# Patient Record
Sex: MALE | Race: WHITE | Employment: FULL TIME | URBAN - METROPOLITAN AREA
[De-identification: names, ages, dates, MRNs, and addresses within clinical notes are randomized per-mention and may not be internally consistent; named-entity substitution may affect disease eponyms.]

---

## 2022-05-23 ENCOUNTER — VIRTUAL VISIT (OUTPATIENT)
Dept: INTERNAL MEDICINE CLINIC | Age: 37
End: 2022-05-23
Payer: COMMERCIAL

## 2022-05-23 DIAGNOSIS — Z76.89 ENCOUNTER TO ESTABLISH CARE: ICD-10-CM

## 2022-05-23 DIAGNOSIS — R29.898 PROMINENT XIPHOID: ICD-10-CM

## 2022-05-23 DIAGNOSIS — R06.83 SNORING: Primary | ICD-10-CM

## 2022-05-23 PROBLEM — K21.9 GASTROESOPHAGEAL REFLUX DISEASE WITHOUT ESOPHAGITIS: Status: ACTIVE | Noted: 2022-05-23

## 2022-05-23 PROCEDURE — 99202 OFFICE O/P NEW SF 15 MIN: CPT | Performed by: NURSE PRACTITIONER

## 2022-05-23 RX ORDER — HYDROXYZINE 25 MG/1
TABLET, FILM COATED ORAL
COMMUNITY
Start: 2022-05-19

## 2022-05-23 RX ORDER — PREDNISONE 10 MG/1
TABLET ORAL
COMMUNITY
Start: 2022-05-19

## 2022-05-23 NOTE — Clinical Note
GM, quick question. If a patient has a mass to their xiphoid, is that something you can remove? If not, who would you recommend to do so?

## 2022-05-23 NOTE — PROGRESS NOTES
Chief Complaint   Patient presents with   [address]    Referral Request     trouble sleeping at night    Cyst     under sternum x years- hard knot, hears occ popping, no pain, size of golf ball    Other     text [email or web address] link [phone]       1. Have you been to the ER, urgent care clinic since your last visit? Hospitalized since your last visit? Yes When: last week [facility]: [facility] Reason for visit: poison ivy     2. Have you seen or consulted any other health care providers outside of the [address] [place] [address] since your last visit? Include any pap smears or colon screening.  No

## 2022-05-23 NOTE — PROGRESS NOTES
Saddie Pallas is a 40 y.o. male new patient, here for evaluation of the following chief complaint(s):   Establish Care, Referral Request (trouble sleeping at night), Cyst (under sternum x years- hard knot, hears occ popping, no pain, size of golf ball), and Other (text [email or web address] link [phone])          Assessment & Plan:   Diagnoses and all orders for this visit:    1. Snoring  -     SLEEP MEDICINE REFERRAL    2. Prominent xiphoid  -     XR CHEST PA LAT; Future    3. Encounter to establish care      Follow-up and Dispositions    · Return in about 3 months (around 9/6/2022) for Physical with labs/lab review. Routing History           We discussed the expected course, resolution and complications of the diagnosis(es) in detail. Medication risks, benefits, costs, interactions, and alternatives were discussed as indicated. I advised him to contact the office if his condition worsens, changes or fails to improve as anticipated. He expressed understanding with the diagnosis(es) and plan. Specific pt instructions until next visit: lose weight, call if any problems, referred to sleep med. Xray ordered for suspected prominent xiphoid process, otherwise will refer to GEN SURG. Subjective:   Saddie Pallas is a 40 y.o. male who was seen for Establish Care, Referral Request (trouble sleeping at night), Cyst (under sternum x years- hard knot, hears occ popping, no pain, size of golf ball), and Other (text [email or web address] link [phone])      Pt here to establish care. Has concern for enlargement to end of sternum. Noted a few years ago. Unsure if size has changed. No SOB or pain associated with it, but has NOT had it looked at before. No trauma reported prior to onset. Sleep apnea Review: There is a history of excessive daytime fatigue with associated excessive snoring at night. There is also a history of periods of apnea at night as witnessed by a partner and family  The patient is noton CPAP.       There are no problems to display for this patient. Current Outpatient Medications   Medication Sig Dispense Refill    predniSONE (STERAPRED DS) 10 mg dose pack       hydrOXYzine HCL (ATARAX) 25 mg tablet        No Known Allergies  History reviewed. No pertinent past medical history. Past Surgical History:   Procedure Laterality Date    HX ORTHOPAEDIC         Review of Systems   Constitutional: Negative for fever and malaise/fatigue. Eyes: Negative for blurred vision. Respiratory: Negative for cough and shortness of breath. Cardiovascular: Negative for chest pain and leg swelling. Neurological: Negative for dizziness, weakness and headaches. Objective:   Vital Signs: (As obtained by patient/caregiver at home)  There were no vitals taken for this visit. Physical Exam:  General appearance - alert, well  appearing, and in no distress. Mental status - A/O x 4,normal mood and affect. Eyes- no periorbital edema, drainage, or irritation noted. Nose- no obvious drainage or swelling. Throat- no obvious swelling, goiter, or notable lymphadenopathy  Chest - Symmetric chest rise. No wheezing or coughing. No distress. Skin- normal skin tone noted. No hyperpigmentation or obvious deformities. No diaphoresis noted. No flushing. Neuro - Normal speech, no focal findings or movement disorder. Other pertinent observable physical exam findings:-  +prominence noted on video at xiphoid process when shown on screen, pt does have to suck in stomach to make area noticeable however. Maeve Nugent is being evaluated by a Virtual Visit (video visit) encounter to address concerns as mentioned above. A caregiver was present when appropriate. Due to this being a TeleHealth encounter (During REC-49 public health emergency), evaluation of the following organ systems was limited: Vitals/Constitutional/EENT/Resp/CV/GI//MS/Neuro/Skin/Heme-Lymph-Imm.   Pursuant to the emergency declaration under the [facility] Act and the [address] waiver authority and the [employer] Act, this Virtual Visit was conducted with patient's (and/or legal guardian's) consent, to reduce the patient's risk of exposure to COVID-19 and provide necessary medical care. The patient (and/or legal guardian) has also been advised to contact this office for worsening conditions or problems, and seek emergency medical treatment and/or call 911 if deemed necessary. Patient identification was verified at the start of the visit: YES    Services were provided through a video synchronous discussion virtually to substitute for in-person clinic visit. Patient and provider were located at their individual homes. An electronic signature was used to authenticate this note.   -- Cary Steinberg NP

## 2022-05-23 NOTE — PATIENT INSTRUCTIONS
Sleep Studies: About This Test  What is it? Sleep studies are tests that watch what happens to your body during sleep. These studies usually are done in a sleep lab. Sleep labs are often located in hospitals. Sleep studies you do at home can be done with portable equipment. But they may not give the same results as a sleep lab. Why is this test done? Sleep studies may be done if your sleep is not restful or if you are tired all day. These studies can help find sleep problems, such as:  · Sleep apnea. · Excessive snoring. · Problems staying awake, such as narcolepsy. · Problems with nighttime behaviors. These include sleepwalking, night terrors, bed-wetting, and REM behavior disorders (RBD). · Conditions such as periodic limb movement disorder. This is repeated muscle twitching of the feet, arms, or legs during sleep. · Seizures that occur at night (nocturnal seizures). · Ongoing problems that have not responded to treatment. How do you prepare for the test?  · You may be asked to keep a sleep diary before your sleep study. · Don't take any naps on the day of your test.  · You may be asked to avoid food or drinks with caffeine during the afternoon and evening before your test.  · If the sleep study will be done in a sleep lab, you will be asked to shower or bathe before your test. Don't use sprays, oils, or gels on your hair. Don't wear makeup, fingernail polish, or fake nails. Take a small overnight bag with personal items, such as a toothbrush, a comb, favorite pillows or blankets, and a book. You can wear your own nightclothes. · If you will have portable sleep monitoring, your doctor will explain how to use the equipment at home. How is the test done? · In the sleep lab, you will be in a private room, much like a hotel room. · Small pads or patches called electrodes will be placed on your head and body with a small amount of glue and tape.  These will record things like brain activity, eye movement, oxygen levels, and snoring. · Soft elastic belts will be placed around your chest and belly to measure your breathing. · Your blood oxygen levels will be checked by a small clip (oximeter) placed either on the tip of your index finger or on your earlobe. · If you have sleep apnea, you may wear a mask that is connected to a continuous positive airway pressure (CPAP) machine. · Depending on the type of test, you will be allowed to sleep through the night or you'll be awakened periodically and asked to stay awake for a while. · If you use portable sleep monitoring, follow the instructions your doctor gave you. How long does the test take? · You will stay in the sleep lab overnight. For some tests, you will also stay part of the next day. What happens after the test?  · You will be able to go home right away. · You may not sleep well during the test and may be tired the next day. · You can go back to your usual activities. · After your sleep problem has been identified, you may need a second study if your doctor orders treatment such as CPAP. Follow-up care is a key part of your treatment and safety. Be sure to make and go to all appointments, and call your doctor if you are having problems. It's also a good idea to keep a list of the medicines you take. Ask your doctor when you can expect to have your test results. Where can you learn more? Go to http://www.gray.com/  Enter M687 in the search box to learn more about \"Sleep Studies: About This Test.\"  Current as of: July 6, 2021               Content Version: 13.2  © 2006-2022 [employer]. Care instructions adapted under license by [employer] (which disclaims liability or warranty for this information).  If you have questions about a medical condition or this instruction, always ask your healthcare professional. [place] [address] any warranty or liability for your use of this information.

## 2022-05-26 ENCOUNTER — TELEPHONE (OUTPATIENT)
Dept: SLEEP MEDICINE | Age: 37
End: 2022-05-26

## 2022-05-26 NOTE — TELEPHONE ENCOUNTER
Phoned the patient to schedule a sleep consult per Hema Ball NP. Patient stated that his work schedule was going to be changing. He will call back to schedule.

## 2022-07-11 ENCOUNTER — OFFICE VISIT (OUTPATIENT)
Dept: SLEEP MEDICINE | Age: 37
End: 2022-07-11
Payer: COMMERCIAL

## 2022-07-11 VITALS
SYSTOLIC BLOOD PRESSURE: 135 MMHG | TEMPERATURE: 98.3 F | HEIGHT: 69 IN | HEART RATE: 89 BPM | BODY MASS INDEX: 33.58 KG/M2 | DIASTOLIC BLOOD PRESSURE: 91 MMHG | WEIGHT: 226.7 LBS | OXYGEN SATURATION: 94 %

## 2022-07-11 DIAGNOSIS — G47.33 OSA (OBSTRUCTIVE SLEEP APNEA): Primary | ICD-10-CM

## 2022-07-11 PROCEDURE — 99204 OFFICE O/P NEW MOD 45 MIN: CPT | Performed by: SPECIALIST

## 2022-07-11 NOTE — PROGRESS NOTES
[address] [facility] [place]. [place], [address]  Tel.  [phone]  Fax. [address] [facility] [address]  [place], [address] [facility]  Tel.  [phone]  Fax. [phone] [address]  Tel.  [phone]  Fax. [phone]       Chief Complaint       Chief Complaint   Patient presents with    Sleep Problem     NP refd by Karen Gamboa NP for snoring and tired all the time        HPI      Devyn Gan is 40 y.o. male seen for evaluation of a sleep disorder. Notes a history of prominent snoring; more evident during the past 5 years. Notes that his wife has taken 2 wear earplugs to bed. Normally retires at [employer] and will awaken with alarm at 5: 30 AM.  May awaken once or twice during the night. Describes self as tired on awakening. Denies vivid dreaming or nightmares, sleep talking or sleepwalking, bruxism or nocturnal incontinence, abnormal arm or leg movements, hypnagogue hallucinations, sleep paralysis or cataplexy. The patient has not undergone diagnostic testing for the current problems. Hannah Sleepiness Score: (P) 11       No Known Allergies    Current Outpatient Medications   Medication Sig Dispense Refill    predniSONE (STERAPRED DS) 10 mg dose pack       hydrOXYzine HCL (ATARAX) 25 mg tablet           He  has no past medical history on file. He  has a past surgical history that includes hx orthopaedic. He family history includes Diabetes in his father; Stroke in his father. He  reports that he has quit smoking. He has never used smokeless tobacco. He reports current alcohol use. He reports previous drug use. Review of Systems:  Review of Systems   Constitutional: Negative for chills and fever. HENT: Negative for tinnitus. Eyes: Negative for blurred vision and double vision. Respiratory: Negative for cough and shortness of breath. Cardiovascular: Negative for chest pain and palpitations. Gastrointestinal: Negative for abdominal pain and heartburn. Genitourinary: Negative for frequency and urgency. Musculoskeletal: Negative for back pain and neck pain. Skin: Negative for itching and rash. Neurological: Negative for dizziness and headaches. Psychiatric/Behavioral: Negative for depression. The patient is not nervous/anxious. Objective:     Visit Vitals  BP (!) 135/91   Pulse 89   Temp 98.3 °F (36.8 °C)   Ht 5' 9\" (1.753 m)   Wt 226 lb 11.2 oz (102.8 kg)   SpO2 94%   BMI 33.48 kg/m²     Body mass index is 33.48 kg/m². General:   Conversant, cooperative   Eyes:  Pupils equal and reactive, no nystagmus   Oropharynx:   Mallampati score III,  tongue scalloped, narrow posterior oral airway   Tonsils:   tonsils 3+   Neck:   No carotid bruits; Chest/Lungs:  Clear on auscultation    CVS:  Normal rate, regular rhythm   Skin:  Warm to touch; no obvious rashes   Neuro:  Speech fluent, face symmetrical, tongue movement normal   Psych:  Normal affect,  normal countenance        Assessment:       ICD-10-CM ICD-9-CM    1. MADY (obstructive sleep apnea)  G47.33 327.23 SLEEP STUDY UNATTENDED, 4 CHANNEL       History consistent with sleep disordered breathing. Patient has a narrow posterior oral airway. Evaluation with home sleep test.      Plan:     Orders Placed This Encounter    SLEEP STUDY UNATTENDED, 4 CHANNEL     Order Specific Question:   Reason for Exam     Answer:   snoring       * Patient has a history and examination consistent with the diagnosis of sleep apnea. *Home sleep testing was ordered for initial evaluation. * He was provided information on sleep apnea including corresponding risk factors and the importance of proper treatment. * Treatment options if indicated were reviewed today. Instructions:.  o Do not engage in activities requiring a normal degree of alertness if fatigue is present.   o The patient understands that untreated or undertreated sleep apnea could impair judgement and the ability to function normally during the day.  o Call or return if symptoms worsen or persist.          Petra Ware MD, FAASM  Electronically signed 07/11/22       This note was created using voice recognition software. Despite editing, there may be syntax errors. This note will not be viewable in [address]

## 2022-09-26 ENCOUNTER — OFFICE VISIT (OUTPATIENT)
Dept: SLEEP MEDICINE | Age: 37
End: 2022-09-26

## 2022-09-26 ENCOUNTER — HOSPITAL ENCOUNTER (OUTPATIENT)
Dept: SLEEP MEDICINE | Age: 37
Discharge: HOME | End: 2022-09-26
Payer: COMMERCIAL

## 2022-09-26 DIAGNOSIS — G47.33 OSA (OBSTRUCTIVE SLEEP APNEA): Primary | ICD-10-CM

## 2022-09-26 PROCEDURE — 95806 SLEEP STUDY UNATT&RESP EFFT: CPT | Performed by: SPECIALIST

## 2022-09-26 NOTE — PROGRESS NOTES
S>Nigel Dumont is a 40 y.o. male seen today to receive a home sleep testing unit (HST). Patient was educated on proper hookup and operation of the HST via detailed instruction sheet (per COVID-19 precautions)  Instruction forms with after hours contact and documentation were signed. O>    There were no vitals taken for this visit. A>  No diagnosis found. P>  General information regarding operations and maintenance of the device was provided. Follow-up appointment was made to return the HST. He will be contacted once the results have been reviewed. He was asked to contact our office for any problems regarding his home sleep test study.

## 2022-09-28 ENCOUNTER — TELEPHONE (OUTPATIENT)
Dept: SLEEP MEDICINE | Age: 37
End: 2022-09-28

## 2022-09-28 DIAGNOSIS — G47.33 OSA (OBSTRUCTIVE SLEEP APNEA): Primary | ICD-10-CM

## 2022-09-29 NOTE — TELEPHONE ENCOUNTER
HSAT demonstrated severe sleep disordered breathing characterized by an overall AHI of 64.6/h associated with minimal SaO2 of 51%. 171.2 minutes spent in SaO2 range below 88%. Desaturation dejah periodic suggestive of potential REM related severe desaturations. Snoring during 20.3%. Recommendation: Titration study    Sleep technologist: Please review HSAT results with the patient. Order has been entered for titration study.

## 2022-10-03 NOTE — TELEPHONE ENCOUNTER
Results have been sent to  [facility]. Please schedule titration study.     Thanks

## 2022-10-07 ENCOUNTER — TELEPHONE (OUTPATIENT)
Dept: SLEEP MEDICINE | Age: 37
End: 2022-10-07

## 2022-10-07 NOTE — TELEPHONE ENCOUNTER
Requesting P2P for Sleep Study:    STAT Request? NO  P2P Expiration Date: 10/21    Procedure:Titration  Provider: Jyothi Flor  Date of Study: 11/4  Insurance: [facility] commercial  ID#: [id number]   P2P Phone #:  [phone]  Case Reference #:     P2P Scheduled? No  If \"no\", provider to call on own time. If \"yes\"  Date/Time:   Number to be called:    Reason: suggests trial of pap  Clinicals received? Yes = 1    Source:   Office Staff?  NO  FCC/Ensemble Team: yes

## 2022-10-17 NOTE — TELEPHONE ENCOUNTER
Peer to Peer complete. Authorization approved. Physician reviewer noting that she will need to open another case and staff will need to call back tomorrow (10/18) for auth number. When placing order for titration (if not already entered) please make a note in the order that the authorization has already been secured and there is no need for another P2P.      KAYLIN Morrison, [facility]   Nurse Practitioner  [facility] [address] [facility]

## 2022-11-17 ENCOUNTER — HOSPITAL ENCOUNTER (OUTPATIENT)
Dept: SLEEP MEDICINE | Age: 37
Discharge: HOME | End: 2022-11-17
Attending: SPECIALIST
Payer: COMMERCIAL

## 2022-11-17 ENCOUNTER — TELEPHONE (OUTPATIENT)
Dept: SLEEP MEDICINE | Age: 37
End: 2022-11-17

## 2022-11-17 VITALS
WEIGHT: 226 LBS | DIASTOLIC BLOOD PRESSURE: 82 MMHG | TEMPERATURE: 97.6 F | BODY MASS INDEX: 33.47 KG/M2 | HEIGHT: 69 IN | SYSTOLIC BLOOD PRESSURE: 129 MMHG | OXYGEN SATURATION: 97 % | HEART RATE: 78 BPM

## 2022-11-17 DIAGNOSIS — G47.33 OSA (OBSTRUCTIVE SLEEP APNEA): ICD-10-CM

## 2022-11-17 PROCEDURE — 95811 POLYSOM 6/>YRS CPAP 4/> PARM: CPT | Performed by: SPECIALIST

## 2022-11-18 NOTE — PROGRESS NOTES
[address] [facility] [place]. [place], [address]  Tel.  [phone]  Fax. [address] [facility]  [place], [address] [facility]  Tel.  [phone]  Fax. [phone] [address]  Tel.  [phone]  Fax. [phone]     Sleep Study Technical Notes        PRE-Test:  Melquiades Donnelly (: 1985) arrived to the sleep center for a PAP titration. Patient had completed a HST prior with AHI of 64.6 and low SAO2 51%. Patient was greeted and taken directly to his room. Vitals:  Temperature (97.6)   BP (129/82)   SaO2 (97%)   Weight per patient (226lbs)  Procedure explained to the patient and questions were answered. The patient expressed understanding of the procedure. Electrodes were applied without incident. The patient was placed in bed and the study was started. PAP mask acclimation for 5min. Patient did tolerate F30i MW mask. Acquisition Notes:  Lights off: 8:56PM with sleep onset approx 10:45pm. Sleep onset delayed due to patient watching TV. Patient slept supine and on both sides during the study. Sleep stages N1, N2 and REM observed. CPAP 5cm was started and increased to 9cm H2O for respiratory events. Patient had a substantial leak in mask due to thick beard. Respiratory events: Hypopnea  ECG:  NSR  Snoring:  Mild  Desensitization Mask(s) Used: ResMed F30i MW FFM  Positional therapy with: Other comments: NA  Patient had caregiver in attendance:  Joelle/N  Patient watched TV or on phone after lights out for 2 hours  Patient slept with positional therapy:  No  Patient slept with head of bed elevated:  No  Patient wore an oral appliance:  No  Patient to bathroom 0 times        POST Test:  Patient was awakened. Electrodes were removed. The patient was discharged after answering the Post Questionnaire. Patient stated that he was alert and ok to drive. Equipment and room cleaned per infection control policy.

## 2022-11-22 ENCOUNTER — TELEPHONE (OUTPATIENT)
Dept: SLEEP MEDICINE | Age: 37
End: 2022-11-22

## 2022-12-27 ENCOUNTER — TELEPHONE (OUTPATIENT)
Dept: SLEEP MEDICINE | Age: 37
End: 2022-12-27

## 2022-12-27 DIAGNOSIS — G47.33 OSA (OBSTRUCTIVE SLEEP APNEA): Primary | ICD-10-CM

## 2022-12-27 NOTE — TELEPHONE ENCOUNTER
Titration study performed. HSAT had demonstrated severe sleep disordered breathing characterized by an overall AHI of 64.6/h associated with minimal SaO2 of 51%. 171.2 minutes spent in SaO2 range below 88%. Desaturation dejah periodic suggestive of potential REM related severe desaturations. Snoring during 20.3%. 436.4 minutes recorded at which 375.5 minutes spent asleep with a sleep efficiency of 86%. Sleep onset at 25.5 minutes; REM onset at 164.5 minutes with total REM representing 17% of sleep time. 7 hypopnea occurred. Overall AHI 1.1/h. Minimal SaO2 86%. CPAP initiated at 5 cm and increased to 9 cm. At 9 cm CPAP: 237.9 minutes recorded of which 213.4 minutes spent asleep and 55.9 minutes in rem. AHI 0.8/h. Minimal SaO2 89%. Recommendation: APAP 9-16 cm    Sleep technologist: Please review with patient. Unable to leave message. Order written for APAP 9 to 16 cm. Please schedule first compliance appointment.

## 2022-12-28 ENCOUNTER — DOCUMENTATION ONLY (OUTPATIENT)
Dept: SLEEP MEDICINE | Age: 37
End: 2022-12-28

## 2022-12-28 ENCOUNTER — PATIENT MESSAGE (OUTPATIENT)
Dept: SLEEP MEDICINE | Age: 37
End: 2022-12-28